# Patient Record
Sex: MALE | Race: OTHER | URBAN - METROPOLITAN AREA
[De-identification: names, ages, dates, MRNs, and addresses within clinical notes are randomized per-mention and may not be internally consistent; named-entity substitution may affect disease eponyms.]

---

## 2017-02-24 ENCOUNTER — IMPORTED ENCOUNTER (OUTPATIENT)
Dept: URBAN - METROPOLITAN AREA CLINIC 9 | Facility: CLINIC | Age: 47
End: 2017-02-24

## 2017-05-11 ENCOUNTER — IMPORTED ENCOUNTER (OUTPATIENT)
Dept: URBAN - METROPOLITAN AREA CLINIC 9 | Facility: CLINIC | Age: 47
End: 2017-05-11

## 2017-06-16 ENCOUNTER — IMPORTED ENCOUNTER (OUTPATIENT)
Dept: URBAN - METROPOLITAN AREA CLINIC 9 | Facility: CLINIC | Age: 47
End: 2017-06-16

## 2017-08-25 ENCOUNTER — IMPORTED ENCOUNTER (OUTPATIENT)
Dept: URBAN - METROPOLITAN AREA CLINIC 9 | Facility: CLINIC | Age: 47
End: 2017-08-25

## 2017-09-08 ENCOUNTER — IMPORTED ENCOUNTER (OUTPATIENT)
Dept: URBAN - METROPOLITAN AREA CLINIC 9 | Facility: CLINIC | Age: 47
End: 2017-09-08

## 2017-10-02 ENCOUNTER — IMPORTED ENCOUNTER (OUTPATIENT)
Dept: URBAN - METROPOLITAN AREA CLINIC 9 | Facility: CLINIC | Age: 47
End: 2017-10-02

## 2018-04-17 ENCOUNTER — IMPORTED ENCOUNTER (OUTPATIENT)
Dept: URBAN - METROPOLITAN AREA CLINIC 9 | Facility: CLINIC | Age: 48
End: 2018-04-17

## 2018-08-30 ENCOUNTER — IMPORTED ENCOUNTER (OUTPATIENT)
Dept: URBAN - METROPOLITAN AREA CLINIC 9 | Facility: CLINIC | Age: 48
End: 2018-08-30

## 2018-09-18 ENCOUNTER — IMPORTED ENCOUNTER (OUTPATIENT)
Dept: URBAN - METROPOLITAN AREA CLINIC 9 | Facility: CLINIC | Age: 48
End: 2018-09-18

## 2019-03-18 ENCOUNTER — IMPORTED ENCOUNTER (OUTPATIENT)
Dept: URBAN - METROPOLITAN AREA CLINIC 9 | Facility: CLINIC | Age: 49
End: 2019-03-18

## 2019-10-21 ENCOUNTER — IMPORTED ENCOUNTER (OUTPATIENT)
Dept: URBAN - METROPOLITAN AREA CLINIC 9 | Facility: CLINIC | Age: 49
End: 2019-10-21

## 2021-01-28 NOTE — PATIENT DISCUSSION
MYOPIA, OU- DISC OPT OF REFRACTIVE SX-VS-GLS/[id number]. PT UNDERSTANDS OPTIONS AND DESIRES TO PROCEED WITH LASIK TO REDUCE DEPENDENCY ON GLS/CTLS.

## 2021-09-10 ENCOUNTER — IMPORTED ENCOUNTER (OUTPATIENT)
Dept: URBAN - METROPOLITAN AREA CLINIC 9 | Facility: CLINIC | Age: 51
End: 2021-09-10

## 2021-09-10 PROBLEM — E11.9: Noted: 2021-09-10

## 2021-10-16 ASSESSMENT — TONOMETRY
OS_IOP_MMHG: 17
OD_IOP_MMHG: 15
OD_IOP_MMHG: 7
OS_IOP_MMHG: 15
OD_IOP_MMHG: 14
OS_IOP_MMHG: 19
OD_IOP_MMHG: 11
OS_IOP_MMHG: 15
OD_IOP_MMHG: 14
OS_IOP_MMHG: 17
OS_IOP_MMHG: 14
OD_IOP_MMHG: 12
OS_IOP_MMHG: 13
OS_IOP_MMHG: 13
OD_IOP_MMHG: 17
OD_IOP_MMHG: 18
OS_IOP_MMHG: 12
OS_IOP_MMHG: 10
OD_IOP_MMHG: 17
OD_IOP_MMHG: 12

## 2021-10-16 ASSESSMENT — VISUAL ACUITY
OS_CC: 20/20 SN
OD_CC: 20/20 SN
OS_SC: 20/50 -2 SN
OD_CC: 20/20 SN
OS_CC: 20/20 SN
OS_SC: 20/30 SN
OD_CC: 20/25 SN
OS_CC: 20/25 SN
OD_CC: 20/20 SN
OD_CC: 20/30 + SN
OD_CC: 20/25 SN
OD_CC: 20/25 SN
OS_CC: 20/20 +2 SN
OS_CC: 20/25 SN
OS_CC: 20/25 SN
OD_CC: 20/20 - SN
OD_SC: 20/200 SN
OD_CC: 20/25 - SN
OS_SC: 20/20 - SN
OD_SC: 20/40 + SN
OS_CC: 20/20 - SN
OD_CC: 20/20 SN
OD_SC: 20/40 + SN
OD_PH: 20/30 - SN
OS_CC: 20/20 - SN
OS_PH: 20/25 SN
OS_CC: 20/25 SN
OS_CC: 20/25 + SN

## 2021-10-16 ASSESSMENT — KERATOMETRY
OS_K2POWER_DIOPTERS: 45
OD_K1POWER_DIOPTERS: 44
OS_AXISANGLE_DEGREES: 8
OS_K1POWER_DIOPTERS: 44
OD_AXISANGLE2_DEGREES: 82
OD_K2POWER_DIOPTERS: 44.75
OD_AXISANGLE_DEGREES: 172
OS_AXISANGLE2_DEGREES: 98

## 2021-10-16 ASSESSMENT — PACHYMETRY
OS_CT_UM: 538.0
OD_CT_UM: 543.0

## 2021-11-11 ENCOUNTER — VISUAL FIELD ONLY (OUTPATIENT)
Dept: URBAN - METROPOLITAN AREA CLINIC 9 | Facility: CLINIC | Age: 51
End: 2021-11-11

## 2021-11-11 DIAGNOSIS — H40.1131: ICD-10-CM

## 2021-11-11 PROCEDURE — 92083 EXTENDED VISUAL FIELD XM: CPT

## 2022-07-02 RX ORDER — AMLODIPINE BESYLATE 10 MG/1
TABLET ORAL
COMMUNITY
Start: 2021-09-29

## 2022-09-06 PROBLEM — M54.50 CHRONIC LOW BACK PAIN: Status: ACTIVE | Noted: 2022-09-06

## 2022-09-06 PROBLEM — E13.9 LADA (LATENT AUTOIMMUNE DIABETES IN ADULTS), MANAGED AS TYPE 1 (HCC): Status: ACTIVE | Noted: 2022-09-06

## 2022-09-06 PROBLEM — M25.512 PAIN IN LEFT SHOULDER: Status: ACTIVE | Noted: 2022-09-06

## 2022-09-06 PROBLEM — I10 ESSENTIAL HYPERTENSION: Status: ACTIVE | Noted: 2022-09-06

## 2022-09-06 PROBLEM — I48.91 ATRIAL FIBRILLATION (HCC): Status: ACTIVE | Noted: 2022-09-06

## 2022-09-06 PROBLEM — N52.2 DRUG-INDUCED ERECTILE DYSFUNCTION: Status: ACTIVE | Noted: 2022-09-06

## 2022-09-06 PROBLEM — E78.5 HYPERLIPIDEMIA: Status: ACTIVE | Noted: 2022-09-06

## 2022-09-06 PROBLEM — G89.29 OTHER CHRONIC PAIN: Status: ACTIVE | Noted: 2022-09-06

## 2022-09-06 PROBLEM — G89.29 CHRONIC LOW BACK PAIN: Status: ACTIVE | Noted: 2022-09-06

## 2022-09-06 PROBLEM — E11.9 NEW ONSET TYPE 2 DIABETES MELLITUS (HCC): Status: ACTIVE | Noted: 2022-09-06
